# Patient Record
Sex: MALE | Race: WHITE | NOT HISPANIC OR LATINO
[De-identification: names, ages, dates, MRNs, and addresses within clinical notes are randomized per-mention and may not be internally consistent; named-entity substitution may affect disease eponyms.]

---

## 2018-10-26 PROBLEM — Z00.00 ENCOUNTER FOR PREVENTIVE HEALTH EXAMINATION: Status: ACTIVE | Noted: 2018-10-26

## 2018-11-02 ENCOUNTER — RECORD ABSTRACTING (OUTPATIENT)
Age: 83
End: 2018-11-02

## 2018-11-02 RX ORDER — ASPIRIN 81 MG/1
81 TABLET, CHEWABLE ORAL
Refills: 0 | Status: ACTIVE | COMMUNITY

## 2018-11-02 RX ORDER — KETOCONAZOLE 20 MG/G
2 CREAM TOPICAL
Refills: 0 | Status: ACTIVE | COMMUNITY

## 2018-11-14 ENCOUNTER — APPOINTMENT (OUTPATIENT)
Dept: INTERNAL MEDICINE | Facility: CLINIC | Age: 83
End: 2018-11-14
Payer: MEDICARE

## 2018-11-14 VITALS — HEART RATE: 80 BPM | SYSTOLIC BLOOD PRESSURE: 162 MMHG | DIASTOLIC BLOOD PRESSURE: 82 MMHG

## 2018-11-14 PROCEDURE — G0008: CPT

## 2018-11-14 PROCEDURE — 90662 IIV NO PRSV INCREASED AG IM: CPT

## 2018-11-14 PROCEDURE — 99214 OFFICE O/P EST MOD 30 MIN: CPT | Mod: 25

## 2018-11-14 NOTE — PHYSICAL EXAM
[Normal Rate] : normal rate  [Regular Rhythm] : with a regular rhythm [Normal S1, S2] : normal S1 and S2 [I] : a grade 1

## 2018-11-14 NOTE — HISTORY OF PRESENT ILLNESS
[Diabetes Mellitus] : Diabetes Mellitus [Hypertension] : Hypertension [Patient was last seen on ___] : Patient was last seen on [unfilled] [Family Member] : family member [No episodes] : No hypoglycemic episodes since the last visit. [Checks BP Sporadically] : The patient checks ~his/her~ blood pressure sporadically [de-identified] : too high of late [de-identified] : Incr dose Losartan HCTZ from half a day to whole a day 3 d ago

## 2018-11-14 NOTE — ASSESSMENT
[FreeTextEntry1] : Multiple meds for BP\par With AI (thou modest) concern over too much of a drop in BP and risk of fall / orthostasis in 86 year old with DM\par

## 2018-12-03 ENCOUNTER — RX RENEWAL (OUTPATIENT)
Age: 83
End: 2018-12-03

## 2018-12-12 ENCOUNTER — APPOINTMENT (OUTPATIENT)
Dept: INTERNAL MEDICINE | Facility: CLINIC | Age: 83
End: 2018-12-12
Payer: MEDICARE

## 2018-12-12 VITALS
WEIGHT: 173 LBS | HEART RATE: 52 BPM | DIASTOLIC BLOOD PRESSURE: 67 MMHG | SYSTOLIC BLOOD PRESSURE: 155 MMHG | BODY MASS INDEX: 28.82 KG/M2 | HEIGHT: 65 IN

## 2018-12-12 VITALS — DIASTOLIC BLOOD PRESSURE: 70 MMHG | SYSTOLIC BLOOD PRESSURE: 150 MMHG

## 2018-12-12 PROCEDURE — 99213 OFFICE O/P EST LOW 20 MIN: CPT

## 2018-12-12 NOTE — PHYSICAL EXAM
[No Respiratory Distress] : no respiratory distress  [Clear to Auscultation] : lungs were clear to auscultation bilaterally [Normal Rate] : normal rate  [Regular Rhythm] : with a regular rhythm

## 2019-01-22 ENCOUNTER — RX RENEWAL (OUTPATIENT)
Age: 84
End: 2019-01-22

## 2019-03-15 ENCOUNTER — RX RENEWAL (OUTPATIENT)
Age: 84
End: 2019-03-15

## 2019-03-15 ENCOUNTER — RECORD ABSTRACTING (OUTPATIENT)
Age: 84
End: 2019-03-15

## 2019-03-18 ENCOUNTER — MEDICATION RENEWAL (OUTPATIENT)
Age: 84
End: 2019-03-18

## 2019-03-18 ENCOUNTER — APPOINTMENT (OUTPATIENT)
Dept: INTERNAL MEDICINE | Facility: CLINIC | Age: 84
End: 2019-03-18
Payer: MEDICARE

## 2019-03-18 VITALS — HEART RATE: 57 BPM | SYSTOLIC BLOOD PRESSURE: 153 MMHG | DIASTOLIC BLOOD PRESSURE: 77 MMHG

## 2019-03-18 DIAGNOSIS — E78.00 PURE HYPERCHOLESTEROLEMIA, UNSPECIFIED: ICD-10-CM

## 2019-03-18 DIAGNOSIS — I10 ESSENTIAL (PRIMARY) HYPERTENSION: ICD-10-CM

## 2019-03-18 DIAGNOSIS — E11.9 TYPE 2 DIABETES MELLITUS W/OUT COMPLICATIONS: ICD-10-CM

## 2019-03-18 DIAGNOSIS — I35.1 NONRHEUMATIC AORTIC (VALVE) INSUFFICIENCY: ICD-10-CM

## 2019-03-18 PROCEDURE — 99214 OFFICE O/P EST MOD 30 MIN: CPT

## 2019-03-18 RX ORDER — METFORMIN HYDROCHLORIDE 500 MG/1
500 TABLET, COATED ORAL DAILY
Qty: 270 | Refills: 3 | Status: DISCONTINUED | COMMUNITY
Start: 1900-01-01 | End: 2019-03-18

## 2019-03-18 NOTE — HISTORY OF PRESENT ILLNESS
[Diabetes Mellitus] : Diabetes Mellitus [Hyperlipidemia] : Hyperlipidemia [Hypertension] : Hypertension [No episodes] : No hypoglycemic episodes since the last visit. [Target goal met] : A1C target goal met [Moderate Intensity] : Patient is currently on moderate intensity statin  therapy [Does not check BP] : The patient is not checking blood pressure [Near target goal] : BP near target goal [Doing Well] : Patient is doing well [Moderate Intensity Therapy] : Patient is currently on moderate intensity statin  therapy [FreeTextEntry1] : AI   no symptoms

## 2019-05-10 ENCOUNTER — RX RENEWAL (OUTPATIENT)
Age: 84
End: 2019-05-10

## 2019-06-07 ENCOUNTER — MEDICATION RENEWAL (OUTPATIENT)
Age: 84
End: 2019-06-07

## 2019-06-10 ENCOUNTER — APPOINTMENT (OUTPATIENT)
Dept: INTERNAL MEDICINE | Facility: CLINIC | Age: 84
End: 2019-06-10
Payer: MEDICARE

## 2019-06-10 VITALS
OXYGEN SATURATION: 99 % | BODY MASS INDEX: 29.02 KG/M2 | DIASTOLIC BLOOD PRESSURE: 71 MMHG | SYSTOLIC BLOOD PRESSURE: 170 MMHG | HEART RATE: 54 BPM | HEIGHT: 64 IN | WEIGHT: 170 LBS

## 2019-06-10 DIAGNOSIS — J98.8 OTHER SPECIFIED RESPIRATORY DISORDERS: ICD-10-CM

## 2019-06-10 PROCEDURE — 99213 OFFICE O/P EST LOW 20 MIN: CPT

## 2019-06-10 NOTE — PHYSICAL EXAM
[Normal Rhythm/Effort] : normal respiratory rhythm and effort [Clear Bilaterally] : the lungs were clear to auscultation bilaterally [Normal to Percussion] : the lungs were normal to percussion [Normal] : palpation of the chest was normal

## 2019-06-10 NOTE — HISTORY OF PRESENT ILLNESS
[Cough] : cough [Cold Symptoms] : cold symptoms [Moderate] : moderate [Congestion] : congestion [Sore Throat] : sore throat

## 2019-07-10 ENCOUNTER — MEDICATION RENEWAL (OUTPATIENT)
Age: 84
End: 2019-07-10

## 2019-08-03 NOTE — HISTORY OF PRESENT ILLNESS
[FreeTextEntry1] : For F/U on BP on current meds   in face of major aortic insufficiency [de-identified] : Felling well\par Adherent to meds\par No apparent side effects\par Not posturally symptomatic Other

## 2019-09-04 ENCOUNTER — RX RENEWAL (OUTPATIENT)
Age: 84
End: 2019-09-04

## 2019-09-09 ENCOUNTER — MEDICATION RENEWAL (OUTPATIENT)
Age: 84
End: 2019-09-09

## 2019-12-19 ENCOUNTER — MEDICATION RENEWAL (OUTPATIENT)
Age: 84
End: 2019-12-19

## 2020-03-14 ENCOUNTER — RX RENEWAL (OUTPATIENT)
Age: 85
End: 2020-03-14

## 2020-03-30 ENCOUNTER — RX RENEWAL (OUTPATIENT)
Age: 85
End: 2020-03-30

## 2020-06-15 RX ORDER — SITAGLIPTIN 100 MG/1
100 TABLET, FILM COATED ORAL
Qty: 30 | Refills: 3 | Status: ACTIVE | COMMUNITY
Start: 2019-01-22 | End: 1900-01-01

## 2020-06-29 ENCOUNTER — RX RENEWAL (OUTPATIENT)
Age: 85
End: 2020-06-29

## 2020-06-29 RX ORDER — LOSARTAN POTASSIUM AND HYDROCHLOROTHIAZIDE 12.5; 5 MG/1; MG/1
50-12.5 TABLET ORAL DAILY
Qty: 90 | Refills: 3 | Status: ACTIVE | COMMUNITY
Start: 2020-06-29 | End: 1900-01-01

## 2020-09-14 RX ORDER — DILTIAZEM HYDROCHLORIDE 240 MG/1
240 CAPSULE, EXTENDED RELEASE ORAL DAILY
Qty: 90 | Refills: 3 | Status: ACTIVE | COMMUNITY
Start: 1900-01-01 | End: 1900-01-01

## 2020-09-16 ENCOUNTER — RX RENEWAL (OUTPATIENT)
Age: 85
End: 2020-09-16

## 2020-09-18 ENCOUNTER — RX RENEWAL (OUTPATIENT)
Age: 85
End: 2020-09-18

## 2020-11-13 RX ORDER — ATORVASTATIN CALCIUM 20 MG/1
20 TABLET, FILM COATED ORAL
Qty: 30 | Refills: 0 | Status: ACTIVE | COMMUNITY
Start: 1900-01-01 | End: 1900-01-01

## 2020-12-09 RX ORDER — NEBIVOLOL HYDROCHLORIDE 10 MG/1
10 TABLET ORAL
Qty: 90 | Refills: 0 | Status: ACTIVE | COMMUNITY
Start: 2020-03-14 | End: 1900-01-01

## 2020-12-09 RX ORDER — METFORMIN ER 500 MG 500 MG/1
500 TABLET ORAL
Qty: 270 | Refills: 0 | Status: ACTIVE | COMMUNITY
Start: 2019-03-18 | End: 1900-01-01

## 2022-10-05 ENCOUNTER — OFFICE (OUTPATIENT)
Dept: URBAN - METROPOLITAN AREA CLINIC 34 | Facility: CLINIC | Age: 87
Setting detail: OPHTHALMOLOGY
End: 2022-10-05
Payer: MEDICARE

## 2022-10-05 DIAGNOSIS — H35.40: ICD-10-CM

## 2022-10-05 DIAGNOSIS — H25.13: ICD-10-CM

## 2022-10-05 DIAGNOSIS — E11.9: ICD-10-CM

## 2022-10-05 DIAGNOSIS — H01.001: ICD-10-CM

## 2022-10-05 DIAGNOSIS — H35.033: ICD-10-CM

## 2022-10-05 DIAGNOSIS — H00.025: ICD-10-CM

## 2022-10-05 DIAGNOSIS — H01.004: ICD-10-CM

## 2022-10-05 DIAGNOSIS — H00.022: ICD-10-CM

## 2022-10-05 DIAGNOSIS — H16.223: ICD-10-CM

## 2022-10-05 PROCEDURE — 92134 CPTRZ OPH DX IMG PST SGM RTA: CPT | Performed by: OPHTHALMOLOGY

## 2022-10-05 PROCEDURE — 99204 OFFICE O/P NEW MOD 45 MIN: CPT | Performed by: OPHTHALMOLOGY

## 2022-10-05 ASSESSMENT — REFRACTION_MANIFEST
OS_VA1: 20/50+1
OS_AXIS: 165
OD_VA1: 20/60-2
OD_CYLINDER: +1.75
OD_SPHERE: +0.25
OD_AXIS: 010
OS_CYLINDER: +1.25
OS_VA1: 20/30-2
OS_SPHERE: +0.75
OD_SPHERE: PLANO
OS_CYLINDER: +2.00
OD_AXIS: 005
OS_SPHERE: +1.00
OS_AXIS: 170
OD_CYLINDER: +1.75
OD_VA1: 20/40-2

## 2022-10-05 ASSESSMENT — VISUAL ACUITY
OD_BCVA: 20/50-2
OS_BCVA: 20/60-1

## 2022-10-05 ASSESSMENT — KERATOMETRY
OD_K2POWER_DIOPTERS: 43.50
OD_AXISANGLE_DEGREES: 177
OS_AXISANGLE_DEGREES: 163
OS_K2POWER_DIOPTERS: 44.25
METHOD_AUTO_MANUAL: AUTO
OD_K1POWER_DIOPTERS: 42.25
OS_K1POWER_DIOPTERS: 43.50

## 2022-10-05 ASSESSMENT — LID EXAM ASSESSMENTS
OS_COMMENTS: TELANGIECTASIA
OD_BLEPHARITIS: RUL T
OD_MEIBOMITIS: RLL 1+
OS_MEIBOMITIS: LLL 1+
OS_BLEPHARITIS: LUL T
OD_COMMENTS: TELANGIECTASIA

## 2022-10-05 ASSESSMENT — AXIALLENGTH_DERIVED
OD_AL: 23.385
OS_AL: 22.8454
OS_AL: 22.7999
OD_AL: 23.385
OS_AL: 22.7999

## 2022-10-05 ASSESSMENT — SPHEQUIV_DERIVED
OS_SPHEQUIV: 1.75
OD_SPHEQUIV: 1.125
OD_SPHEQUIV: 1.125
OS_SPHEQUIV: 1.625
OS_SPHEQUIV: 1.75

## 2022-10-05 ASSESSMENT — REFRACTION_AUTOREFRACTION
OD_AXIS: 009
OS_AXIS: 163
OD_CYLINDER: +1.75
OS_CYLINDER: +2.00
OS_SPHERE: +0.75
OD_SPHERE: +0.25

## 2022-10-05 ASSESSMENT — REFRACTION_CURRENTRX
OD_OVR_VA: 20/
OS_OVR_VA: 20/
OD_SPHERE: +3.00
OS_CYLINDER: +0.25
OD_VPRISM_DIRECTION: SV
OS_SPHERE: +3.50
OS_AXIS: 119
OD_CYLINDER: SPHERE
OS_VPRISM_DIRECTION: SV

## 2022-10-05 ASSESSMENT — TONOMETRY: OS_IOP_MMHG: 20

## 2022-10-05 ASSESSMENT — CONFRONTATIONAL VISUAL FIELD TEST (CVF)
OS_FINDINGS: FULL
OD_FINDINGS: FULL

## 2022-11-09 ENCOUNTER — OFFICE (OUTPATIENT)
Dept: URBAN - METROPOLITAN AREA CLINIC 34 | Facility: CLINIC | Age: 87
Setting detail: OPHTHALMOLOGY
End: 2022-11-09
Payer: MEDICARE

## 2022-11-09 DIAGNOSIS — H25.13: ICD-10-CM

## 2022-11-09 DIAGNOSIS — H25.12: ICD-10-CM

## 2022-11-09 DIAGNOSIS — H25.11: ICD-10-CM

## 2022-11-09 PROCEDURE — 99213 OFFICE O/P EST LOW 20 MIN: CPT | Performed by: OPHTHALMOLOGY

## 2022-11-09 PROCEDURE — 92136 OPHTHALMIC BIOMETRY: CPT | Performed by: OPHTHALMOLOGY

## 2022-11-09 ASSESSMENT — REFRACTION_CURRENTRX
OS_OVR_VA: 20/
OS_VPRISM_DIRECTION: SV
OD_OVR_VA: 20/
OS_CYLINDER: +0.25
OS_AXIS: 119
OD_CYLINDER: SPHERE
OS_SPHERE: +3.50
OD_VPRISM_DIRECTION: SV
OD_SPHERE: +3.00

## 2022-11-09 ASSESSMENT — REFRACTION_AUTOREFRACTION
OD_AXIS: 007
OS_CYLINDER: +1.75
OD_SPHERE: -0.25
OS_AXIS: 161
OS_SPHERE: +1.00
OD_CYLINDER: +2.00

## 2022-11-09 ASSESSMENT — REFRACTION_MANIFEST
OD_VA1: 20/40-2
OD_VA1: 20/60-2
OS_VA1: 20/50+1
OD_SPHERE: +0.25
OS_CYLINDER: +2.00
OS_AXIS: 170
OD_SPHERE: PLANO
OS_SPHERE: +1.00
OD_AXIS: 010
OS_CYLINDER: +1.25
OS_SPHERE: +0.75
OS_AXIS: 165
OS_VA1: 20/30-2
OD_CYLINDER: +1.75
OD_AXIS: 005
OD_CYLINDER: +1.75

## 2022-11-09 ASSESSMENT — VISUAL ACUITY
OD_BCVA: 20/25-2
OS_BCVA: 20/25-2

## 2022-11-09 ASSESSMENT — KERATOMETRY
OS_AXISANGLE_DEGREES: 090
OS_K2POWER_DIOPTERS: 44.00
OS_K1POWER_DIOPTERS: 44.00
OD_K1POWER_DIOPTERS: 42.25
OD_K2POWER_DIOPTERS: 43.75
OD_AXISANGLE_DEGREES: 175
METHOD_AUTO_MANUAL: AUTO

## 2022-11-09 ASSESSMENT — SPHEQUIV_DERIVED
OD_SPHEQUIV: 0.75
OS_SPHEQUIV: 1.875
OS_SPHEQUIV: 1.75
OD_SPHEQUIV: 1.125
OS_SPHEQUIV: 1.625

## 2022-11-09 ASSESSMENT — CONFRONTATIONAL VISUAL FIELD TEST (CVF)
OD_FINDINGS: FULL
OS_FINDINGS: FULL

## 2022-11-09 ASSESSMENT — AXIALLENGTH_DERIVED
OS_AL: 22.8025
OS_AL: 22.7571
OD_AL: 23.4839
OD_AL: 23.34
OS_AL: 22.7118

## 2022-11-17 ENCOUNTER — AMBULATORY SURGERY CENTER (OUTPATIENT)
Dept: URBAN - METROPOLITAN AREA SURGERY 7 | Facility: SURGERY | Age: 87
Setting detail: OPHTHALMOLOGY
End: 2022-11-17
Payer: MEDICARE

## 2022-11-17 DIAGNOSIS — H25.12: ICD-10-CM

## 2022-11-17 PROCEDURE — 68841 INSJ RX ELUT IMPLT LAC CANAL: CPT | Performed by: OPHTHALMOLOGY

## 2022-11-17 PROCEDURE — 66984 XCAPSL CTRC RMVL W/O ECP: CPT | Performed by: OPHTHALMOLOGY

## 2022-11-18 ENCOUNTER — OFFICE (OUTPATIENT)
Dept: URBAN - METROPOLITAN AREA CLINIC 35 | Facility: CLINIC | Age: 87
Setting detail: OPHTHALMOLOGY
End: 2022-11-18
Payer: MEDICARE

## 2022-11-18 DIAGNOSIS — H25.11: ICD-10-CM

## 2022-11-18 DIAGNOSIS — Z96.1: ICD-10-CM

## 2022-11-18 PROCEDURE — 92136 OPHTHALMIC BIOMETRY: CPT | Performed by: OPHTHALMOLOGY

## 2022-11-18 PROCEDURE — 99024 POSTOP FOLLOW-UP VISIT: CPT | Performed by: OPHTHALMOLOGY

## 2022-11-18 ASSESSMENT — REFRACTION_MANIFEST
OD_CYLINDER: +1.75
OS_VA1: 20/50+1
OS_VA1: 20/30-2
OS_SPHERE: +0.75
OD_AXIS: 005
OS_AXIS: 165
OS_AXIS: 170
OD_CYLINDER: +1.75
OD_VA1: 20/60-2
OD_SPHERE: +0.25
OS_SPHERE: +1.00
OD_VA1: 20/40-2
OD_AXIS: 010
OS_CYLINDER: +1.25
OD_SPHERE: PLANO
OS_CYLINDER: +2.00

## 2022-11-18 ASSESSMENT — REFRACTION_CURRENTRX
OS_SPHERE: +3.50
OD_VPRISM_DIRECTION: SV
OD_OVR_VA: 20/
OD_CYLINDER: SPHERE
OS_AXIS: 119
OS_OVR_VA: 20/
OS_VPRISM_DIRECTION: SV
OS_CYLINDER: +0.25
OD_SPHERE: +3.00

## 2022-11-18 ASSESSMENT — REFRACTION_AUTOREFRACTION
OS_AXIS: 016
OS_CYLINDER: +1.50
OD_AXIS: 008
OS_SPHERE: -1.00
OD_SPHERE: +0.25
OD_CYLINDER: +1.50

## 2022-11-18 ASSESSMENT — VISUAL ACUITY
OS_BCVA: 20/50+1
OD_BCVA: 20/50+2

## 2022-11-18 ASSESSMENT — KERATOMETRY
OD_AXISANGLE_DEGREES: 179
OS_AXISANGLE_DEGREES: 037
METHOD_AUTO_MANUAL: AUTO
OD_K1POWER_DIOPTERS: 42.25
OS_K2POWER_DIOPTERS: 44.75
OD_K2POWER_DIOPTERS: 43.50
OS_K1POWER_DIOPTERS: 43.50

## 2022-11-18 ASSESSMENT — SPHEQUIV_DERIVED
OS_SPHEQUIV: 1.625
OD_SPHEQUIV: 1.125
OD_SPHEQUIV: 1
OS_SPHEQUIV: -0.25
OS_SPHEQUIV: 1.75

## 2022-11-18 ASSESSMENT — AXIALLENGTH_DERIVED
OS_AL: 22.7144
OS_AL: 22.7597
OD_AL: 23.385
OS_AL: 23.4605
OD_AL: 23.4329

## 2022-11-18 ASSESSMENT — LID EXAM ASSESSMENTS
OS_MEIBOMITIS: LLL 1+
OD_BLEPHARITIS: RUL T
OD_COMMENTS: TELANGIECTASIA
OD_MEIBOMITIS: RLL 1+
OS_BLEPHARITIS: LUL T

## 2022-11-18 ASSESSMENT — CONFRONTATIONAL VISUAL FIELD TEST (CVF)
OS_FINDINGS: FULL
OD_FINDINGS: FULL

## 2022-12-01 ENCOUNTER — AMBULATORY SURGERY CENTER (OUTPATIENT)
Dept: URBAN - METROPOLITAN AREA SURGERY 7 | Facility: SURGERY | Age: 87
Setting detail: OPHTHALMOLOGY
End: 2022-12-01
Payer: MEDICARE

## 2022-12-01 DIAGNOSIS — H25.11: ICD-10-CM

## 2022-12-01 PROCEDURE — 66984 XCAPSL CTRC RMVL W/O ECP: CPT | Performed by: OPHTHALMOLOGY

## 2022-12-01 PROCEDURE — 68841 INSJ RX ELUT IMPLT LAC CANAL: CPT | Performed by: OPHTHALMOLOGY

## 2022-12-02 ENCOUNTER — OFFICE (OUTPATIENT)
Dept: URBAN - METROPOLITAN AREA CLINIC 35 | Facility: CLINIC | Age: 87
Setting detail: OPHTHALMOLOGY
End: 2022-12-02
Payer: MEDICARE

## 2022-12-02 ENCOUNTER — RX ONLY (RX ONLY)
Age: 87
End: 2022-12-02

## 2022-12-02 DIAGNOSIS — Z96.1: ICD-10-CM

## 2022-12-02 PROBLEM — H01.001: Status: ACTIVE | Noted: 2022-12-02

## 2022-12-02 PROBLEM — H25.11 CATARACT SENILE NUCLEAR SCLEROSIS; RIGHT EYE: Status: RESOLVED | Noted: 2022-11-09 | Resolved: 2022-12-02

## 2022-12-02 PROBLEM — H16.223 DRY EYE SYNDROME K SICCA; BOTH EYES: Status: ACTIVE | Noted: 2022-10-05

## 2022-12-02 PROBLEM — H00.022 HORDEOLUM INTERNUM;  , RIGHT LOWER LID: Status: ACTIVE | Noted: 2022-12-02

## 2022-12-02 PROBLEM — H35.033 HYPERTENSIVE RETINOPATHY; BOTH EYES: Status: ACTIVE | Noted: 2022-10-05

## 2022-12-02 PROCEDURE — 99024 POSTOP FOLLOW-UP VISIT: CPT | Performed by: OPHTHALMOLOGY

## 2022-12-02 ASSESSMENT — CONFRONTATIONAL VISUAL FIELD TEST (CVF)
OS_FINDINGS: FULL
OD_FINDINGS: FULL

## 2022-12-02 ASSESSMENT — REFRACTION_CURRENTRX
OD_VPRISM_DIRECTION: SV
OS_VPRISM_DIRECTION: SV
OD_CYLINDER: SPHERE
OD_OVR_VA: 20/
OS_AXIS: 119
OS_OVR_VA: 20/
OS_SPHERE: +3.50
OS_CYLINDER: +0.25
OD_SPHERE: +3.00

## 2022-12-02 ASSESSMENT — REFRACTION_MANIFEST
OD_VA1: 20/40-2
OS_VA1: 20/50+1
OD_CYLINDER: +1.75
OD_AXIS: 005
OD_VA1: 20/60-2
OD_SPHERE: PLANO
OS_AXIS: 170
OD_SPHERE: +0.25
OD_AXIS: 010
OS_CYLINDER: +2.00
OS_SPHERE: +1.00
OS_VA1: 20/30-2
OS_SPHERE: +0.75
OS_AXIS: 165
OD_CYLINDER: +1.75
OS_CYLINDER: +1.25

## 2022-12-02 ASSESSMENT — LID EXAM ASSESSMENTS
OD_MEIBOMITIS: RLL 1+
OS_MEIBOMITIS: LLL 1+
OS_BLEPHARITIS: LUL T
OD_BLEPHARITIS: RUL T

## 2022-12-02 ASSESSMENT — SPHEQUIV_DERIVED
OS_SPHEQUIV: 1.625
OS_SPHEQUIV: -0.5
OD_SPHEQUIV: -0.875
OS_SPHEQUIV: 1.75
OD_SPHEQUIV: 1.125

## 2022-12-02 ASSESSMENT — REFRACTION_AUTOREFRACTION
OS_SPHERE: -1.25
OD_SPHERE: -1.25
OD_CYLINDER: +0.75
OS_AXIS: 158
OS_CYLINDER: +1.50
OD_AXIS: 012

## 2022-12-02 ASSESSMENT — AXIALLENGTH_DERIVED
OS_AL: 23.6031
OS_AL: 22.8025
OS_AL: 22.7571
OD_AL: 23.34
OD_AL: 24.1284

## 2022-12-02 ASSESSMENT — KERATOMETRY
OD_K1POWER_DIOPTERS: 42.50
OS_K2POWER_DIOPTERS: 44.00
OD_AXISANGLE_DEGREES: 151
OS_AXISANGLE_DEGREES: 180
OS_K1POWER_DIOPTERS: 44.00
OD_K2POWER_DIOPTERS: 43.50
METHOD_AUTO_MANUAL: AUTO

## 2022-12-02 ASSESSMENT — VISUAL ACUITY
OD_BCVA: 20/50+1
OS_BCVA: 20/50+2